# Patient Record
(demographics unavailable — no encounter records)

---

## 2025-06-10 NOTE — PHYSICAL EXAM
[Abdomen Tenderness] : ~T No ~M abdominal tenderness [JVD] : no jugular venous distention  [Normal Breath Sounds] : Normal breath sounds [No Rash or Lesion] : No rash or lesion [Alert] : alert [Calm] : calm [de-identified] : Well appearing male in NAD [de-identified] : MMM [de-identified] : ROM WNL [FreeTextEntry1] : The pt was examined in the prone sheba-knife position with a medical assistant present for the entirety of the examination. Visual examination of the anal verge with effacement of the buttocks revealed a posterior midline fissure with exposed internal sphincter without rolled edges and without signs of infection. Otherwise no masses, ulcerations, or skin rashes. Digital rectal exam and anoscopy deferred to follow up.  The patient tolerated the exam well.

## 2025-06-10 NOTE — HISTORY OF PRESENT ILLNESS
[FreeTextEntry1] : 35 y/o M presents for initial consultation for concern of fissure. Patient reports intermittent bleeding with pain with BMs. Symptoms mostly noted with straining and harder BMs. Last time he saw blood was 3 days go noted on TP and in bowl. Reports pain is burning, sharp and throbbing that can linger for a few hours. Was recently started on HC 2.5% for hemorrhoids and H. Pylori treatment.  BMS are 1x a day, currently not on any stool softeners. But is taking Metamucil daily.   PMH: Denies PSH: Denies  FH: Denies CRC/IBD Meds: Denies  Allergies: NKDA Current vaper EGD/C-scope: 05/16/2025 with Dr. Max, no report on HIE, as per patient was noted some fissures and hemorrhhoids EGD noted H. Pylori

## 2025-06-10 NOTE — PLAN
[TextEntry] : - Hold on further HC use. - Trial of topical therapy. - Continued fiber supplementation. - Adequate hydration. - Warm baths frequently  - RTC in 1 month.